# Patient Record
Sex: MALE | Race: WHITE | NOT HISPANIC OR LATINO | Employment: FULL TIME | URBAN - METROPOLITAN AREA
[De-identification: names, ages, dates, MRNs, and addresses within clinical notes are randomized per-mention and may not be internally consistent; named-entity substitution may affect disease eponyms.]

---

## 2020-03-16 ENCOUNTER — OFFICE VISIT (OUTPATIENT)
Dept: INTERNAL MEDICINE | Facility: CLINIC | Age: 20
End: 2020-03-16
Payer: COMMERCIAL

## 2020-03-16 VITALS
SYSTOLIC BLOOD PRESSURE: 122 MMHG | TEMPERATURE: 99 F | BODY MASS INDEX: 16.72 KG/M2 | DIASTOLIC BLOOD PRESSURE: 69 MMHG | WEIGHT: 112.88 LBS | OXYGEN SATURATION: 98 % | RESPIRATION RATE: 16 BRPM | HEIGHT: 69 IN | HEART RATE: 120 BPM

## 2020-03-16 DIAGNOSIS — F43.23 ACUTE ADJUSTMENT DISORDER WITH MIXED ANXIETY AND DEPRESSED MOOD: Primary | ICD-10-CM

## 2020-03-16 PROCEDURE — 99203 OFFICE O/P NEW LOW 30 MIN: CPT | Mod: S$PBB,,, | Performed by: FAMILY MEDICINE

## 2020-03-16 PROCEDURE — 99999 PR PBB SHADOW E&M-EST. PATIENT-LVL IV: ICD-10-PCS | Mod: PBBFAC,,, | Performed by: FAMILY MEDICINE

## 2020-03-16 PROCEDURE — 99999 PR PBB SHADOW E&M-EST. PATIENT-LVL IV: CPT | Mod: PBBFAC,,, | Performed by: FAMILY MEDICINE

## 2020-03-16 PROCEDURE — 99203 PR OFFICE/OUTPT VISIT, NEW, LEVL III, 30-44 MIN: ICD-10-PCS | Mod: S$PBB,,, | Performed by: FAMILY MEDICINE

## 2020-03-16 RX ORDER — HYDROXYZINE PAMOATE 25 MG/1
25-50 CAPSULE ORAL EVERY 8 HOURS PRN
Qty: 60 CAPSULE | Refills: 0 | Status: SHIPPED | OUTPATIENT
Start: 2020-03-16 | End: 2024-03-19

## 2020-03-16 NOTE — PROGRESS NOTES
Subjective:   Patient ID:  Xu Delarosa is a 20 y.o. male.    Chief Complaint:  Anxiety and Depression    History reviewed. No pertinent past medical history.  History reviewed. No pertinent surgical history.     Family History   Problem Relation Age of Onset    No Known Problems Mother     No Known Problems Father      Review of patient's allergies indicates:  No Known Allergies    Current Outpatient Medications:     hydrOXYzine pamoate (VISTARIL) 25 MG Cap, Take 1-2 capsules (25-50 mg total) by mouth every 8 (eight) hours as needed (Anxiety or Insomnia)., Disp: 60 capsule, Rfl: 0    Patient presents for referral to psychiatry/psychology.    Concerned because of increased anxiety and depressive type symptoms.    Trigger is ended 1 year relationship with boyfriend suddenly.  Had relocated from [place] 1 year ago due to this relationship.    Denies any previous mental health diagnoses.    Denies any previous mental health treatments.    Denies any significant family history for mental health.  Main complaints are difficulty sleeping, anxiety, trouble focusing, and unable to relax.    No suicidal or homicidal ideation.    Agreeing to both medication and therapy.    Anxiety   Presents for initial visit. Onset was 1 to 6 months ago. The problem has been gradually worsening. Symptoms include confusion, decreased concentration, depressed mood, excessive worry, insomnia, irritability, nervous/anxious behavior, palpitations and restlessness. Patient reports no chest pain, compulsions, dizziness, dry mouth, feeling of choking, hyperventilation, malaise, muscle tension, nausea, obsessions, panic, shortness of breath or suicidal ideas. Symptoms occur most days. The severity of symptoms is interfering with daily activities. The symptoms are aggravated by family issues. The quality of sleep is poor. Nighttime awakenings: several.     Risk factors include a major life event. There is no history of anxiety/panic attacks, bipolar  "disorder, depression, fibromyalgia or suicide attempts. Past treatments include nothing.     Review of Systems   Constitutional: Positive for irritability. Negative for activity change and unexpected weight change.   HENT: Negative for hearing loss, rhinorrhea and trouble swallowing.    Eyes: Negative for discharge and visual disturbance.   Respiratory: Negative for chest tightness, shortness of breath and wheezing.    Cardiovascular: Positive for palpitations. Negative for chest pain.   Gastrointestinal: Negative for constipation, diarrhea, nausea and vomiting.   Endocrine: Negative for polydipsia and polyuria.   Genitourinary: Negative for difficulty urinating, hematuria and urgency.   Musculoskeletal: Negative for arthralgias, joint swelling and neck pain.   Neurological: Negative for dizziness, weakness and headaches.   Psychiatric/Behavioral: Positive for confusion, decreased concentration and dysphoric mood. Negative for suicidal ideas. The patient is nervous/anxious and has insomnia.        Objective:   /69 (BP Location: Left arm, Patient Position: Sitting)   Pulse (!) 120   Temp 98.8 °F (37.1 °C) (Oral)   Resp 16   Ht 5' 9" (1.753 m)   Wt 51.2 kg (112 lb 14 oz)   SpO2 98%   BMI 16.67 kg/m²     Physical Exam   Constitutional: He is oriented to person, place, and time. Vital signs are normal. He appears well-developed and well-nourished.   Cardiovascular: Regular rhythm and normal heart sounds. Tachycardia present. Exam reveals no gallop and no friction rub.   No murmur heard.  Pulmonary/Chest: Effort normal and breath sounds normal. He has no wheezes. He has no rhonchi. He has no rales.   Abdominal: Soft. He exhibits no distension. There is no tenderness. There is no rebound, no guarding and no CVA tenderness.   Neurological: He is alert and oriented to person, place, and time. He has normal strength. No sensory deficit. He displays a negative Romberg sign. Coordination and gait normal.   Skin: " Skin is warm, dry and intact. No bruising, no burn, no laceration and no rash noted.   Psychiatric: Judgment and thought content normal. His mood appears not anxious. His affect is not angry, not blunt, not labile and not inappropriate. His speech is delayed. His speech is not rapid and/or pressured, not tangential and not slurred. He is slowed and withdrawn. He is not agitated, not aggressive, not hyperactive, not actively hallucinating and not combative. Thought content is not paranoid and not delusional. Cognition and memory are normal. He exhibits a depressed mood. He expresses no homicidal and no suicidal ideation. He expresses no suicidal plans and no homicidal plans. He is communicative.     Flat affect, monotone, poor eye contact. He is attentive.     Assessment:     1. Acute adjustment disorder with mixed anxiety and depressed mood      Plan:   Acute adjustment disorder with mixed anxiety and depressed mood  -     Ambulatory referral/consult to Psychiatry; Future; Expected date: 03/23/2020  -     hydrOXYzine pamoate (VISTARIL) 25 MG Cap; Take 1-2 capsules (25-50 mg total) by mouth every 8 (eight) hours as needed (Anxiety or Insomnia).  Dispense: 60 capsule; Refill: 0  -     Ambulatory referral/consult to Psychology; Future; Expected date: 03/23/2020    Patient is agreeable / open to both medication and counseling.    Referral to psychiatry and psychology   Start Vistaril as needed for anxiety/ insomnia  Return to clinic 1 month for annual physical exam

## 2020-12-18 ENCOUNTER — OFFICE VISIT (OUTPATIENT)
Dept: INTERNAL MEDICINE | Facility: CLINIC | Age: 20
End: 2020-12-18
Payer: COMMERCIAL

## 2020-12-18 VITALS
BODY MASS INDEX: 17.31 KG/M2 | HEART RATE: 76 BPM | TEMPERATURE: 98 F | WEIGHT: 116.88 LBS | OXYGEN SATURATION: 98 % | HEIGHT: 69 IN

## 2020-12-18 DIAGNOSIS — M25.561 ACUTE PAIN OF RIGHT KNEE: Primary | ICD-10-CM

## 2020-12-18 PROCEDURE — 99999 PR PBB SHADOW E&M-EST. PATIENT-LVL III: CPT | Mod: PBBFAC,,, | Performed by: PHYSICIAN ASSISTANT

## 2020-12-18 PROCEDURE — 3008F BODY MASS INDEX DOCD: CPT | Mod: CPTII,S$GLB,, | Performed by: PHYSICIAN ASSISTANT

## 2020-12-18 PROCEDURE — 99214 OFFICE O/P EST MOD 30 MIN: CPT | Mod: S$GLB,,, | Performed by: PHYSICIAN ASSISTANT

## 2020-12-18 PROCEDURE — 3008F PR BODY MASS INDEX (BMI) DOCUMENTED: ICD-10-PCS | Mod: CPTII,S$GLB,, | Performed by: PHYSICIAN ASSISTANT

## 2020-12-18 PROCEDURE — 99214 PR OFFICE/OUTPT VISIT, EST, LEVL IV, 30-39 MIN: ICD-10-PCS | Mod: S$GLB,,, | Performed by: PHYSICIAN ASSISTANT

## 2020-12-18 PROCEDURE — 99999 PR PBB SHADOW E&M-EST. PATIENT-LVL III: ICD-10-PCS | Mod: PBBFAC,,, | Performed by: PHYSICIAN ASSISTANT

## 2020-12-18 PROCEDURE — 1125F PR PAIN SEVERITY QUANTIFIED, PAIN PRESENT: ICD-10-PCS | Mod: S$GLB,,, | Performed by: PHYSICIAN ASSISTANT

## 2020-12-18 PROCEDURE — 1125F AMNT PAIN NOTED PAIN PRSNT: CPT | Mod: S$GLB,,, | Performed by: PHYSICIAN ASSISTANT

## 2020-12-18 RX ORDER — NAPROXEN 500 MG/1
500 TABLET ORAL 2 TIMES DAILY WITH MEALS
Qty: 14 TABLET | Refills: 0 | Status: SHIPPED | OUTPATIENT
Start: 2020-12-18 | End: 2020-12-25

## 2020-12-18 NOTE — PROGRESS NOTES
Subjective:      Patient ID: Xu Delarosa is a 20 y.o. male.    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 Quality: catches with bending. The pain is at a severity of 1/10 (varies. Sometimes unable to bear weight). The pain has been fluctuating since onset. Pertinent negatives include no inability to bear weight, loss of motion, loss of sensation, muscle weakness, numbness or tingling. Exacerbated by: inactivity. He has tried acetaminophen for the symptoms. The treatment provided no relief.   Denies any recent trauma. Denies any obvisous swelling.  Goes up and down stairs a lot because lives on third floor of [facility].     Review of Systems   Constitutional: Negative for activity change, appetite change, chills, diaphoresis, fatigue, fever and unexpected weight change.   HENT: Negative.  Negative for congestion, hearing loss, postnasal drip, rhinorrhea, sore throat, trouble swallowing and voice change.    Eyes: Negative.  Negative for discharge and visual disturbance.   Respiratory: Negative.  Negative for cough, choking, chest tightness, shortness of breath and wheezing.    Cardiovascular: Negative for chest pain, palpitations and leg swelling.   Gastrointestinal: Negative for abdominal distention, abdominal pain, blood in stool, constipation, diarrhea, nausea and vomiting.   Endocrine: Negative for cold intolerance, heat intolerance, polydipsia and polyuria.   Genitourinary: Negative.  Negative for difficulty urinating, frequency, hematuria and urgency.   Musculoskeletal: Positive for arthralgias. Negative for back pain, gait problem, joint swelling, myalgias, neck pain and neck stiffness.   Skin: Negative for color change, pallor, rash and wound.   Neurological: Negative for dizziness, tingling, tremors, weakness, light-headedness, numbness and headaches.   Hematological: Negative for adenopathy.   Psychiatric/Behavioral:  "Negative for behavioral problems, confusion, dysphoric mood, self-injury, sleep disturbance and suicidal ideas. The patient is not nervous/anxious.      Objective:   Pulse 76   Temp 98.2 °F (36.8 °C) (Tympanic)   Ht 5' 9" (1.753 m)   Wt 53 kg (116 lb 13.5 oz)   SpO2 98%   BMI 17.25 kg/m²     Physical Exam  Vitals signs reviewed.   Constitutional:       Appearance: He is well-developed.   HENT:      Head: Normocephalic and atraumatic.      Right Ear: External ear normal.      Left Ear: External ear normal.      Nose: Nose normal.   Eyes:      Conjunctiva/sclera: Conjunctivae normal.      Pupils: Pupils are equal, round, and reactive to light.   Neck:      Musculoskeletal: Normal range of motion and neck supple.   Cardiovascular:      Rate and Rhythm: Normal rate and regular rhythm.      Heart sounds: Normal heart sounds. No murmur. No friction rub. No gallop.    Pulmonary:      Effort: Pulmonary effort is normal. No respiratory distress.      Breath sounds: Normal breath sounds. No wheezing or rales.   Chest:      Chest wall: No tenderness.   Abdominal:      General: There is no distension.      Palpations: Abdomen is soft.      Tenderness: There is no abdominal tenderness.   Musculoskeletal: Normal range of motion.      Right knee: He exhibits normal range of motion, no swelling, no effusion, no ecchymosis, no deformity, no laceration, no erythema, normal alignment, no LCL laxity, normal patellar mobility, no bony tenderness, normal meniscus and no MCL laxity. Tenderness found. MCL tenderness noted.        Legs:    Lymphadenopathy:      Cervical: No cervical adenopathy.   Skin:     General: Skin is warm and dry.   Neurological:      Mental Status: He is alert and oriented to person, place, and time.   Psychiatric:         Behavior: Behavior normal.         Thought Content: Thought content normal.         Judgment: Judgment normal.         Assessment:     1. Acute pain of right knee      Plan:   Acute pain of " right knee  -     naproxen (NAPROSYN) 500 MG tablet; Take 1 tablet (500 mg total) by mouth 2 (two) times daily with meals. for 7 days  Dispense: 14 tablet; Refill: 0    -RICE  -knee brace  Educational handout on over-the-counter medications and at-home conservative care, pertinent to the patients diagnosis today, was handed to the patient and discussed in detail.  - if no improvement with treatment will refer for pt/ot    Follow up if symptoms worsen or fail to improve.

## 2020-12-18 NOTE — PATIENT INSTRUCTIONS
Quad Set for Leg and Knee    This exercise is designed to stretch and strengthen your knee. Before beginning, read through all the instructions. While exercising, breathe normally and use smooth movements. If you feel any pain, stop the exercise. If pain persists, call your healthcare provider.  1.  Sit on the floor with one leg straight, the other bent.  2.  Flex the foot of your straight leg by pointing your toes toward you. Press the back of your knee into the floor while tightening the muscle on the top of your thigh. Hold for ______ seconds. Then relax.  3.  Repeat ______ times. Do ______ sets a day.  Caution  · Dont arch your back.  · Dont hunch your shoulders.  Date Last Reviewed: 9/20/2015  © 8192-7235 [employer]. [address] [place] [address]. All rights reserved. This information is not intended as a substitute for professional medical care. Always follow your healthcare professional's instructions.          Knee Pain of Uncertain Cause    There are several common causes for knee pain. These can include:  · A sprain of the ligaments that support the joint  · An injury to the cartilage lining of the joint  · Arthritis from wear-and-tear or inflammation  There are other causes as well. There may also be swelling, reduced movement of the knee joint, and pain with walking. A definite diagnosis will still need to be made. If your symptoms do not improve, further follow-up and testing may be needed.  Home care  · Stay off the injured leg as much as possible until pain improves.  · Apply an ice pack over the injured area for 15 to 20 minutes every 3 to 6 hours. You should do this for the first 24 to 48 hours. You can make an ice pack by filling a plastic bag that seals at the top with ice cubes and then wrapping it with a thin towel. Continue to use ice packs for relief of pain and swelling as needed. As the ice melts, be careful to avoid getting your wrap, splint, or cast wet.  After 48 hours, apply heat (warm shower or warm bath) for 15 to 20 minutes several times a day, or alternate ice and heat. If you have to wear a hook-and-loop knee brace, you can open it to apply the ice pack, or heat, directly to the knee. Never put ice directly on the skin. Always wrap the ice in a towel or other type of cloth.  · You may use over-the-counter pain medicine to control pain, unless another pain medicine was prescribed. If you have chronic liver or kidney disease or ever had a stomach ulcer or GI bleeding, talk with your healthcare provider using these medicines.  · If crutches or a walker have been recommended, do not put weight on the injured leg until you can do so without pain. Check with your healthcare provider before returning to sports or full work duties.  · If you have a hook-and-loop knee brace, you can remove it to bathe and sleep, unless told otherwise.  Follow-up care  Follow up with your healthcare provider as advised. This is usually within 1-2 weeks.  If X-rays were taken, you will be told of any new findings that may affect your care.  Call 911  Call 911 if you have:  · Shortness of breath  · Chest pain  When to seek medical advice  Call your healthcare provider right away if any of these occur:  · Toes or foot becomes swollen, cold, blue, numb, or tingly  · Pain or swelling spreads over the knee or calf  · Warmth or redness appears over the knee or calf  · Other joints become painful  · Rash appears  · Fever of 100.4°F (38°C) or above lasting for 24 to 48 hours  Date Last Reviewed: 11/23/2015  © 5151-3429 [employer]. [address] [place] [address]. All rights reserved. This information is not intended as a substitute for professional medical care. Always follow your healthcare professional's instructions.        How Your Knee Works  A healthy knee bends easily and rotates slightly. The joint absorbs stress and moves smoothly. This allows you to walk,  squat, and turn without pain.    A healthy knee  The knee is a hinge joint, formed where the thighbone (femur) and the shinbone (tibia) meet. It is the largest joint in the body. The joint is covered with smooth tissue and powered by large muscles. When all the parts listed below are healthy, a knee should move easily:  · Cartilage is a layer of smooth tissue. It covers the ends of the thighbone and shinbone. It also lines the back side of the kneecap. Healthy cartilage absorbs stress and allows the knee to bend easily.  · Muscles power the knee and leg for movement.  · Tendons attach the muscles to the bones.  · Ligaments are bands of tissue that connect bones and brace the joint.  · Bones that make up your knee joint include your thighbone (femur), shinbone (tibia), and kneecap (patella).  · Menisci are 2 wedge shaped pieces of cartilage that absorb shock between the thighbone and shinbone.  Date Last Reviewed: 9/20/2015 © 2000-2017 [employer]. [address] [place] [address]. All rights reserved. This information is not intended as a substitute for professional medical care. Always follow your healthcare professional's instructions.        R.I.C.E.    R.I.C.E. stands for Rest, Ice, Compression, and Elevation. Doing these things helps limit pain and swelling after an injury. R.I.C.E. also helps injuries heal faster. Use R.I.C.E. for sprains, strains, and severe bruises or bumps. Follow the tips on this handout and begin R.I.C.E. as soon as possible after an injury.  ? Rest  Pain is your bodys way of telling you to rest an injured area. Whether you have hurt an elbow, hand, foot, or knee, limiting its use will prevent further injury and help you heal.  ? Ice  Applying ice right after an injury helps prevent swelling and reduce pain. Dont place ice directly on your skin.  · Wrap a cold pack or bag of ice in a thin cloth. Place it over the injured area.  · Ice for 10 minutes every 3 hours.  Dont ice for more than 20 minutes at a time.  ? Compression  Putting pressure (compression) on an injury helps prevent swelling and provides support.  · Wrap the injured area firmly with an elastic bandage. If your hand or foot tingles, becomes discolored, or feels cold to the touch, the bandage may be too tight. Rewrap it more loosely.  · If your bandage becomes too loose, rewrap it.  · Do not wear an elastic bandage overnight.  ? Elevation  Keeping an injury elevated helps reduce swelling, pain, and throbbing. Elevation is most effective when the injury is kept elevated higher than the heart.     Call your healthcare provider if you notice any of the following:  · Fingers or toes feel numb, are cold to the touch, or change color  · Skin looks shiny or tight  · Pain, swelling, or bruising worsens and is not improved with elevation   Date Last Reviewed: 9/3/2015  © 2716-3148 [employer]. [address] [place] [address], [place] [address]. All rights reserved. This information is not intended as a substitute for professional medical care. Always follow your healthcare professional's instructions.

## 2020-12-27 ENCOUNTER — PATIENT MESSAGE (OUTPATIENT)
Dept: INTERNAL MEDICINE | Facility: CLINIC | Age: 20
End: 2020-12-27

## 2022-04-27 ENCOUNTER — PATIENT MESSAGE (OUTPATIENT)
Dept: ADMINISTRATIVE | Facility: HOSPITAL | Age: 22
End: 2022-04-27
Payer: COMMERCIAL

## 2022-09-16 ENCOUNTER — PATIENT OUTREACH (OUTPATIENT)
Dept: ADMINISTRATIVE | Facility: HOSPITAL | Age: 22
End: 2022-09-16
Payer: COMMERCIAL

## 2022-09-16 NOTE — PROGRESS NOTES
Called patient to schedule overdue PCP appt, Patient answered, patient no longer lives in [place].

## 2023-01-11 ENCOUNTER — OFFICE VISIT (OUTPATIENT)
Dept: PRIMARY CARE CLINIC | Facility: CLINIC | Age: 23
End: 2023-01-11
Payer: COMMERCIAL

## 2023-01-11 DIAGNOSIS — J06.9 UPPER RESPIRATORY TRACT INFECTION, UNSPECIFIED TYPE: Primary | ICD-10-CM

## 2023-01-11 PROCEDURE — 99214 OFFICE O/P EST MOD 30 MIN: CPT | Mod: 95,,, | Performed by: NURSE PRACTITIONER

## 2023-01-11 PROCEDURE — 99214 PR OFFICE/OUTPT VISIT, EST, LEVL IV, 30-39 MIN: ICD-10-PCS | Mod: 95,,, | Performed by: NURSE PRACTITIONER

## 2023-01-11 RX ORDER — FLUTICASONE PROPIONATE 50 MCG
1 SPRAY, SUSPENSION (ML) NASAL DAILY
Qty: 16 G | Refills: 0 | Status: SHIPPED | OUTPATIENT
Start: 2023-01-11 | End: 2023-01-21

## 2023-01-18 NOTE — PROGRESS NOTES
Subjective:       Patient ID: Xu Delarosa is a 22 y.o. male.    Chief Complaint: cough and chest congestion  The patient location is: [place], [place]    Visit type: audiovisual-Synchronous      Face to Face time with patient: 11 min  12 minutes of total time spent on the encounter, which includes face to face time and non-face to face time preparing to see the patient (eg, review of tests), Obtaining and/or reviewing separately obtained history, Documenting clinical information in the electronic or other health record, Independently interpreting results (not separately reported) and communicating results to the patient/family/caregiver, or Care coordination (not separately reported).         Each patient to whom he or she provides medical services by telemedicine is:  (1) informed of the relationship between the physician and patient and the respective role of any other health care provider with respect to management of the patient; and (2) notified that he or she may decline to receive medical services by telemedicine and may withdraw from such care at any time.   History of Present Illness:   Xu Delarosa 22 y.o. male presents today with reports of cough and chest congestion that has been present for about 2 days. Denies any other problems or concerns at this time. Treatment options and alternatives were discussed with the patient. Patient provided opportunity to ask additional questions.  All questions were answered. Voices understanding and acceptance of this advice. Instructed to call back if any further questions or concerns.      No past medical history on file.  Family History   Problem Relation Age of Onset    No Known Problems Mother     No Known Problems Father      Social History     Socioeconomic History    Marital status: Single   Tobacco Use    Smoking status: Never    Smokeless tobacco: Never   Substance and Sexual Activity    Alcohol use: Yes    Drug use: Never    Sexual activity: Yes     Partners: Male      Birth control/protection: Condom     Social Determinants of Health     Financial Resource Strain: Medium Risk    Difficulty of Paying Living Expenses: Somewhat hard   Food Insecurity: No Food Insecurity    Worried About Running Out of Food in the Last Year: Never true    Ran Out of Food in the Last Year: Never true   Transportation Needs: No Transportation Needs    Lack of Transportation (Medical): No    Lack of Transportation (Non-Medical): No   Physical Activity: Insufficiently Active    Days of Exercise per Week: 5 days    Minutes of Exercise per Session: 20 min   Stress: Stress Concern Present    Feeling of Stress : To some extent   Social Connections: Unknown    Frequency of Communication with Friends and Family: Three times a week    Frequency of Social Gatherings with Friends and Family: Patient refused    Active Member of Clubs or Organizations: No    Attends Club or Organization Meetings: Never    Marital Status: Never    Housing Stability: Low Risk     Unable to Pay for Housing in the Last Year: No    Number of Places Lived in the Last Year: 1    Unstable Housing in the Last Year: No     Outpatient Encounter Medications as of 1/11/2023   Medication Sig Dispense Refill    fluticasone propionate (FLONASE) 50 mcg/actuation nasal spray 1 spray (50 mcg total) by Each Nostril route once daily. for 10 days 16 g 0    hydrOXYzine pamoate (VISTARIL) 25 MG Cap Take 1-2 capsules (25-50 mg total) by mouth every 8 (eight) hours as needed (Anxiety or Insomnia). 60 capsule 0     No facility-administered encounter medications on file as of 1/11/2023.       Review of Systems   HENT:  Positive for congestion, sore throat and trouble swallowing. Negative for drooling, ear discharge and ear pain.    Respiratory:  Positive for cough. Negative for shortness of breath and stridor.    Gastrointestinal:  Negative for abdominal pain, diarrhea and vomiting.   Musculoskeletal:  Negative for neck pain.   Neurological:   Positive for headaches.     Objective:      There were no vitals taken for this visit.  Physical Exam    No results found for this or any previous visit.  Assessment:       1. Upper respiratory tract infection, unspecified type          Plan:   Diagnoses and all orders for this visit:    Upper respiratory tract infection, unspecified type    Other orders  -     fluticasone propionate (FLONASE) 50 mcg/actuation nasal spray; 1 spray (50 mcg total) by Each Nostril route once daily. for 10 days              [facility]   [address] [place] [address] [address]  [place] [address]  Office [phone]  Fax [phone]

## 2023-01-19 ENCOUNTER — OFFICE VISIT (OUTPATIENT)
Dept: INTERNAL MEDICINE | Facility: CLINIC | Age: 23
End: 2023-01-19
Payer: COMMERCIAL

## 2023-01-19 VITALS
HEART RATE: 105 BPM | TEMPERATURE: 98 F | DIASTOLIC BLOOD PRESSURE: 60 MMHG | OXYGEN SATURATION: 99 % | HEIGHT: 69 IN | BODY MASS INDEX: 15.87 KG/M2 | WEIGHT: 107.13 LBS | SYSTOLIC BLOOD PRESSURE: 112 MMHG

## 2023-01-19 DIAGNOSIS — J02.9 ACUTE PHARYNGITIS, UNSPECIFIED ETIOLOGY: Primary | ICD-10-CM

## 2023-01-19 PROCEDURE — 3078F DIAST BP <80 MM HG: CPT | Mod: CPTII,S$GLB,, | Performed by: EMERGENCY MEDICINE

## 2023-01-19 PROCEDURE — 1159F MED LIST DOCD IN RCRD: CPT | Mod: CPTII,S$GLB,, | Performed by: EMERGENCY MEDICINE

## 2023-01-19 PROCEDURE — 1160F RVW MEDS BY RX/DR IN RCRD: CPT | Mod: CPTII,S$GLB,, | Performed by: EMERGENCY MEDICINE

## 2023-01-19 PROCEDURE — 3074F PR MOST RECENT SYSTOLIC BLOOD PRESSURE < 130 MM HG: ICD-10-PCS | Mod: CPTII,S$GLB,, | Performed by: EMERGENCY MEDICINE

## 2023-01-19 PROCEDURE — 3008F PR BODY MASS INDEX (BMI) DOCUMENTED: ICD-10-PCS | Mod: CPTII,S$GLB,, | Performed by: EMERGENCY MEDICINE

## 2023-01-19 PROCEDURE — 1160F PR REVIEW ALL MEDS BY PRESCRIBER/CLIN PHARMACIST DOCUMENTED: ICD-10-PCS | Mod: CPTII,S$GLB,, | Performed by: EMERGENCY MEDICINE

## 2023-01-19 PROCEDURE — 3008F BODY MASS INDEX DOCD: CPT | Mod: CPTII,S$GLB,, | Performed by: EMERGENCY MEDICINE

## 2023-01-19 PROCEDURE — 99213 OFFICE O/P EST LOW 20 MIN: CPT | Mod: S$GLB,,, | Performed by: EMERGENCY MEDICINE

## 2023-01-19 PROCEDURE — 3074F SYST BP LT 130 MM HG: CPT | Mod: CPTII,S$GLB,, | Performed by: EMERGENCY MEDICINE

## 2023-01-19 PROCEDURE — 3078F PR MOST RECENT DIASTOLIC BLOOD PRESSURE < 80 MM HG: ICD-10-PCS | Mod: CPTII,S$GLB,, | Performed by: EMERGENCY MEDICINE

## 2023-01-19 PROCEDURE — 99999 PR PBB SHADOW E&M-EST. PATIENT-LVL III: CPT | Mod: PBBFAC,,, | Performed by: EMERGENCY MEDICINE

## 2023-01-19 PROCEDURE — 99213 PR OFFICE/OUTPT VISIT, EST, LEVL III, 20-29 MIN: ICD-10-PCS | Mod: S$GLB,,, | Performed by: EMERGENCY MEDICINE

## 2023-01-19 PROCEDURE — 99999 PR PBB SHADOW E&M-EST. PATIENT-LVL III: ICD-10-PCS | Mod: PBBFAC,,, | Performed by: EMERGENCY MEDICINE

## 2023-01-19 PROCEDURE — 1159F PR MEDICATION LIST DOCUMENTED IN MEDICAL RECORD: ICD-10-PCS | Mod: CPTII,S$GLB,, | Performed by: EMERGENCY MEDICINE

## 2023-01-19 RX ORDER — AZITHROMYCIN 250 MG/1
TABLET, FILM COATED ORAL
Qty: 6 TABLET | Refills: 0 | Status: SHIPPED | OUTPATIENT
Start: 2023-01-19 | End: 2023-01-24

## 2023-01-19 NOTE — PROGRESS NOTES
Subjective:       Patient ID: Xu Delarosa is a 22 y.o. male.    Chief Complaint: Sore Throat    Sore Throat   Associated symptoms include congestion and coughing.     22 yr WM, no PMH, here c/o week long runny nose, sore throat, cough, yellow sputum. It got better initially but then got worse last 2-3 days. No fever but has chest congestion with thick yellow sputum and nasal discharge. Had Covid Vaccine but no Flu vaccine.     History reviewed. No pertinent past medical history.  History reviewed. No pertinent surgical history.  History reviewed. No pertinent surgical history.  Social History     Socioeconomic History    Marital status: Single   Tobacco Use    Smoking status: Never    Smokeless tobacco: Never   Substance and Sexual Activity    Alcohol use: Yes    Drug use: Never    Sexual activity: Yes     Partners: Male     Birth control/protection: Condom     Social Determinants of Health     Financial Resource Strain: Medium Risk    Difficulty of Paying Living Expenses: Somewhat hard   Food Insecurity: No Food Insecurity    Worried About Running Out of Food in the Last Year: Never true    Ran Out of Food in the Last Year: Never true   Transportation Needs: No Transportation Needs    Lack of Transportation (Medical): No    Lack of Transportation (Non-Medical): No   Physical Activity: Insufficiently Active    Days of Exercise per Week: 5 days    Minutes of Exercise per Session: 20 min   Stress: Stress Concern Present    Feeling of Stress : To some extent   Social Connections: Unknown    Frequency of Communication with Friends and Family: Three times a week    Frequency of Social Gatherings with Friends and Family: Patient refused    Active Member of Clubs or Organizations: No    Attends Club or Organization Meetings: Never    Marital Status: Never    Housing Stability: Low Risk     Unable to Pay for Housing in the Last Year: No    Number of Places Lived in the Last Year: 1    Unstable Housing in the Last Year:  No     Review of patient's allergies indicates:  No Known Allergies  Current Outpatient Medications   Medication Sig    fluticasone propionate (FLONASE) 50 mcg/actuation nasal spray 1 spray (50 mcg total) by Each Nostril route once daily. for 10 days (Patient not taking: Reported on 1/19/2023)    hydrOXYzine pamoate (VISTARIL) 25 MG Cap Take 1-2 capsules (25-50 mg total) by mouth every 8 (eight) hours as needed (Anxiety or Insomnia).     No current facility-administered medications for this visit.           Review of Systems   HENT:  Positive for congestion, rhinorrhea and sore throat. Negative for postnasal drip.    Respiratory:  Positive for cough and chest tightness.    Cardiovascular: Negative.    Gastrointestinal: Negative.      Objective:      Physical Exam  Vitals and nursing note reviewed.   Constitutional:       General: He is not in acute distress.     Appearance: Normal appearance. He is not ill-appearing.   HENT:      Right Ear: Tympanic membrane, ear canal and external ear normal.      Left Ear: Tympanic membrane, ear canal and external ear normal.      Nose: Congestion and rhinorrhea present.      Mouth/Throat:      Mouth: Mucous membranes are moist.      Pharynx: Oropharynx is clear. Posterior oropharyngeal erythema present. No oropharyngeal exudate.   Cardiovascular:      Rate and Rhythm: Normal rate and regular rhythm.      Pulses: Normal pulses.      Heart sounds: Normal heart sounds.   Pulmonary:      Effort: No respiratory distress.      Breath sounds: Normal breath sounds. No wheezing or rales.   Musculoskeletal:      Cervical back: Normal range of motion and neck supple.   Neurological:      Mental Status: He is alert.       Assessment:     Vitals:    01/19/23 0902   BP: 112/60   Pulse: 105   Temp: 98.3 °F (36.8 °C)         1. Acute pharyngitis, unspecified etiology        Plan:   Acute pharyngitis, unspecified etiology- rest, gargle with warm salt water tid, Zithromax z pack, Airborne vit plus  MVI and Vit D3 5000 units daily  RTC prn

## 2024-03-19 ENCOUNTER — OFFICE VISIT (OUTPATIENT)
Dept: FAMILY MEDICINE | Facility: CLINIC | Age: 24
End: 2024-03-19
Payer: COMMERCIAL

## 2024-03-19 VITALS
WEIGHT: 105 LBS | TEMPERATURE: 98 F | SYSTOLIC BLOOD PRESSURE: 111 MMHG | HEIGHT: 69 IN | DIASTOLIC BLOOD PRESSURE: 72 MMHG | RESPIRATION RATE: 18 BRPM | OXYGEN SATURATION: 98 % | BODY MASS INDEX: 15.55 KG/M2 | HEART RATE: 61 BPM

## 2024-03-19 DIAGNOSIS — F17.290 VAPING NICOTINE DEPENDENCE, TOBACCO PRODUCT: ICD-10-CM

## 2024-03-19 DIAGNOSIS — F33.1 MODERATE EPISODE OF RECURRENT MAJOR DEPRESSIVE DISORDER: ICD-10-CM

## 2024-03-19 DIAGNOSIS — Z13.220 ENCOUNTER FOR LIPID SCREENING FOR CARDIOVASCULAR DISEASE: ICD-10-CM

## 2024-03-19 DIAGNOSIS — Z13.6 ENCOUNTER FOR LIPID SCREENING FOR CARDIOVASCULAR DISEASE: ICD-10-CM

## 2024-03-19 DIAGNOSIS — E43 SEVERE PROTEIN-CALORIE MALNUTRITION: ICD-10-CM

## 2024-03-19 DIAGNOSIS — Z00.00 ENCOUNTER FOR WELLNESS EXAMINATION: Primary | ICD-10-CM

## 2024-03-19 PROCEDURE — 1159F MED LIST DOCD IN RCRD: CPT | Mod: CPTII,,, | Performed by: FAMILY MEDICINE

## 2024-03-19 PROCEDURE — 3074F SYST BP LT 130 MM HG: CPT | Mod: CPTII,,, | Performed by: FAMILY MEDICINE

## 2024-03-19 PROCEDURE — 3078F DIAST BP <80 MM HG: CPT | Mod: CPTII,,, | Performed by: FAMILY MEDICINE

## 2024-03-19 PROCEDURE — 3008F BODY MASS INDEX DOCD: CPT | Mod: CPTII,,, | Performed by: FAMILY MEDICINE

## 2024-03-19 PROCEDURE — 1160F RVW MEDS BY RX/DR IN RCRD: CPT | Mod: CPTII,,, | Performed by: FAMILY MEDICINE

## 2024-03-19 PROCEDURE — 99214 OFFICE O/P EST MOD 30 MIN: CPT | Mod: 25,,, | Performed by: FAMILY MEDICINE

## 2024-03-19 PROCEDURE — 99395 PREV VISIT EST AGE 18-39: CPT | Mod: ,,, | Performed by: FAMILY MEDICINE

## 2024-03-19 RX ORDER — ESCITALOPRAM OXALATE 5 MG/1
5 TABLET ORAL DAILY
Qty: 30 TABLET | Refills: 0 | Status: SHIPPED | OUTPATIENT
Start: 2024-03-19 | End: 2024-04-11

## 2024-03-19 NOTE — PROGRESS NOTES
"Xu Delarosa  03/19/2024  [id number]    Gilma Driscoll MD  Patient Care Team:  Gilma Driscoll MD as PCP - General      Chief Complaint:  Chief Complaint   Patient presents with    Establish Care     Needs PCP. Pt is c/o depression       History of Present Illness:    24 y.o. male who presents today    Review of Systems  General: denies f/c, weight loss, night sweats, decreased appetite  Eye: denies blurred vision, changes in vision  Respiratory: denies sob, wheezing, cough  Cardiovascular: denies chest pain, palpitations, edema  Gastrointestinal: denies abdominal pain, n/v, constipation, diarrhea  Integumentary: denies rashes, pruritis    Past Medical History  History reviewed. No pertinent past medical history.    Medications  Medication List with Changes/Refills   Discontinued Medications    HYDROXYZINE PAMOATE (VISTARIL) 25 MG CAP    Take 1-2 capsules (25-50 mg total) by mouth every 8 (eight) hours as needed (Anxiety or Insomnia).       History reviewed. No pertinent surgical history.    SUBJECTIVE:  Health Maintenance  The patient has no Health Maintenance topics of status Not Due  Health Maintenance Due   Topic Date Due    Hepatitis C Screening  Never done    Lipid Panel  Never done    HPV Vaccines (1 - Male 2-dose series) Never done    HIV Screening  Never done    TETANUS VACCINE  04/20/2022    Influenza Vaccine (1) 09/01/2023    COVID-19 Vaccine (3 - 2023-24 season) 09/01/2023       Exam:  Vitals:    03/19/24 0928   BP: 111/72   BP Location: Right arm   Patient Position: Sitting   BP Method: Small (Automatic)   Pulse: 61   Resp: 18   Temp: 98.4 °F (36.9 °C)   TempSrc: Oral   SpO2: 98%   Weight: 47.6 kg (105 lb)   Height: 5' 9" (1.753 m)     Weight: 47.6 kg (105 lb)   Body mass index is 15.51 kg/m².      Physical Exam  Constitutional: NAD, alert, pleasant  Respiratory: CTAB, no wheezes, rales or rhonchi. No accessory muscle use  Eyes: EOMI  Cardiovascular: RRR, No m/r/g. No JVD. No LE " edema  Gastrointestinal: BS+, nontender, nondistended  Integumentary: warm, dry, intact  Psych: AA&Ox3    No diagnosis found.    {There are no diagnoses linked to this encounter. (Refresh or delete this SmartLink)}     Follow up: No follow-ups on file.      Care Plan/Goals: Reviewed   Goals    None

## 2024-03-19 NOTE — PROGRESS NOTES
Patient ID: [id number]     Chief Complaint: Establish Care (Needs PCP. Pt is c/o depression)        HPI:     Xu Delarosa is a 24 y.o. male here today for an annual wellness.    As a separate em visit, he c/o anhedonia, difficulty concentrating, feeling down and sad often, hopelessness. He reports feeling this way for about one year. No si/hi, self harm. He is also experiencing some insomnia. He also c/o unintentional weight loss despite eating over 2000 calorie a day diet. Right now, he is eating less due to depression. He has never been treated for depression. He is requesitng a psych referral. He has an appt with a dietitian this afternoon to help with his weight.     He just got screened for all STIS and will be starting PREP.       No past medical history on file.     History reviewed. No pertinent surgical history.    Review of patient's allergies indicates:  No Known Allergies    No outpatient medications have been marked as taking for the 3/19/24 encounter (Office Visit) with Gilma Driscoll MD.       Social History     Socioeconomic History    Marital status: Single   Tobacco Use    Smoking status: Every Day     Types: Vaping with nicotine    Smokeless tobacco: Never   Substance and Sexual Activity    Alcohol use: Yes    Drug use: Never    Sexual activity: Yes     Partners: Male     Birth control/protection: Condom     Social Determinants of Health     Financial Resource Strain: Medium Risk (3/19/2024)    Overall Financial Resource Strain (CARDIA)     Difficulty of Paying Living Expenses: Somewhat hard   Food Insecurity: Food Insecurity Present (3/19/2024)    Hunger Vital Sign     Worried About Running Out of Food in the Last Year: Sometimes true     Ran Out of Food in the Last Year: Sometimes true   Transportation Needs: No Transportation Needs (3/19/2024)    PRAPARE - Transportation     Lack of Transportation (Medical): No     Lack of Transportation (Non-Medical): No   Physical Activity: Inactive  (3/19/2024)    Exercise Vital Sign     Days of Exercise per Week: 0 days     Minutes of Exercise per Session: 0 min   Stress: Stress Concern Present (3/18/2024)    [employer] [place] [employer] - Occupational Stress Questionnaire     Feeling of Stress : Rather much   Social Connections: Unknown (3/19/2024)    Social Connection and Isolation Panel [NHANES]     Frequency of Communication with Friends and Family: More than three times a week     Frequency of Social Gatherings with Friends and Family: Once a week     Active Member of Clubs or Organizations: No     Attends Club or Organization Meetings: Never     Marital Status: Never    Housing Stability: High Risk (3/19/2024)    Housing Stability Vital Sign     Unable to Pay for Housing in the Last Year: No     Number of Places Lived in the Last Year: 3     Unstable Housing in the Last Year: No        Family History   Problem Relation Age of Onset    Heart disease Mother     Heart disease Father     Alcohol abuse Father     Arthritis Maternal Grandmother     Heart disease Maternal Grandmother     Stroke Maternal Grandmother     Heart disease Paternal Grandfather     Diabetes Paternal Grandmother         Patient Care Team:  Gilma Driscoll MD as PCP - General     Subjective:     ROS    See HPI for details    Constitutional: Denies Change in appetite. Denies Chills. Denies Fever. Denies Night sweats.  Eye: Denies Blurred vision. Denies Discharge. Denies Eye pain.  ENT: Denies Decreased hearing. Denies Sore throat. Denies Swollen glands.  Respiratory: Denies Cough. Denies Shortness of breath. Denies Shortness of breath with exertion. Denies Wheezing.  Cardiovascular: DeniesChest pain at rest. Denies Chest pain with exertion. Denies Irregular heartbeat. Denies Palpitations. Denies Edema.  Gastrointestinal: Denies Abdominal pain. DeniesDiarrhea. Denies Nausea. Denies Vomiting. Denies Hematemesis or Hematochezia.  Genitourinary: Denies Dysuria.  "Denies Urinary frequency. Denies Urinary urgency. Denies Blood in urine.  Integumentary: Denies Rash. Denies Itching. Denies Dry skin.  Psychiatric: Denies Depression. Denies Anxiety. Denies Suicidal/Homicidal ideations.    All Other ROS: Negative except as stated in HPI.       Objective:     /72 (BP Location: Right arm, Patient Position: Sitting, BP Method: Small (Automatic))   Pulse 61   Temp 98.4 °F (36.9 °C) (Oral)   Resp 18   Ht 5' 9" (1.753 m)   Wt 47.6 kg (105 lb)   SpO2 98%   BMI 15.51 kg/m²     Physical Exam    General: Alert and oriented, No acute distress. Temporal wasting  Head: Normocephalic, Atraumatic.  Eye: Pupils are equal, round and reactive to light, Extraocular movements are intact, Sclera non-icteric.  Ears/Nose/Throat: Tm+ light reflexes bilaterally. Normal, Mucosa moist,Clear. Teeth intact. NO lesions of tongue, palate, mucosa  Respiratory: Clear to auscultation bilaterally; No wheezes, rales or rhonchi,Non-labored respirations, Symmetrical chest wall expansion.  Cardiovascular: Regular rate and rhythm, S1/S2 normal, No murmurs, rubs or gallops.  Gastrointestinal: Soft, Non-tender, Non-distended, Normal bowel sounds, No palpable organomegaly.  Integumentary: Warm, Dry, Intact, No suspicious lesions or rashes.  Extremities: No clubbing, cyanosis or edema  Psychiatric: Normal interaction, Coherent speech, Euthymic mood, Appropriate affect       Assessment:       ICD-10-CM ICD-9-CM   1. Encounter for wellness examination  Z00.00 V70.0   2. Moderate episode of recurrent major depressive disorder  F33.1 296.32   3. Vaping nicotine dependence, tobacco product  F17.290 305.1   4. Severe protein-calorie malnutrition  E43 262   5. Encounter for lipid screening for cardiovascular disease  Z13.220 V77.91    Z13.6 V81.2        Plan:       The patient has no Health Maintenance topics of status Not Due     AAA Screening - screening for abdominal aneurysms if you have ever smoked a " cigarette    Prostate Cancer Screening - starting at age 45 for  men and 50 if . Start sooner if father/brother with prostate cancer    Colon Cancer Screening -  recommended starting at age 45 unless a first degree relative has/had colon cancer then may be needed sooner    Eye Exam - Recommend annually.     Dental Exam - Recommend biannual exams.     Vaccinations -   Immunization History   Administered Date(s) Administered    COVID-19, MRNA, LN-S, PF (Pfizer) (Purple Cap) 04/26/2021, 05/22/2021    DT (Pediatric) 1999, 1999, 09/16/2004, 05/09/2005    HIB 1999, 1999    Hepatitis B, Pediatric/Adolescent 1999, 1999, 1999    Measles 09/16/2004, 05/09/2005    Meningococcal Polysaccharide (23-Valent) (MPSV4) 04/20/2012    Mumps 09/16/2004, 05/09/2005    Poliovirus 1999, 1999, 09/16/2004    Rubella 09/16/2004, 05/09/2005    Tdap 04/20/2012    Varicella 09/16/2004, 04/20/2012      Patient was counseled on risks/benefits of receiving immunization. All questions were answered    Problem List Items Addressed This Visit          Psychiatric    Moderate episode of recurrent major depressive disorder    Overview     Refer to psych per patient's request  Start lexapro 5 mg daily  Rtc 2 wks with labs    I counseled patient on adverse effects such as n/v, d/c, anxiety, worsening depression or SI/HI. Call 911 or go to ED if you have SI/HI.   Take medication at bedtime if it causes drowsiness during the day.           Relevant Medications    EScitalopram oxalate (LEXAPRO) 5 MG Tab    Other Relevant Orders    Ambulatory referral/consult to Psychiatry       Endocrine    Severe protein-calorie malnutrition    Overview     He has an appt with a dietitian this afternoon  Recommend high fat high protein diet  Will check all vitamin levels           Relevant Orders    Iron and TIBC    Ferritin    Folate    Vitamin B12    Vitamin D       Other    Vaping nicotine  dependence, tobacco product    Overview     He vapes daily through the day with nicotine.     Smoking cessation encouraged          Other Visit Diagnoses       Encounter for wellness examination    -  Primary    Relevant Orders    CBC Auto Differential    Comprehensive Metabolic Panel    Lipid Panel    TSH    Urinalysis    Iron and TIBC    Encounter for lipid screening for cardiovascular disease        Relevant Orders    Lipid Panel            Exercise for a total of 150 min per week and eat a healthy diet  Perform monthly self testicular exams to screen for testicular cancer  Stay up to date with all cancer screening discussed in visit  Immunizations due were discussed during visit  All health maintenance was reviewed with patient. Patient verbalized understanding. All questions were answered.     Medication List with Changes/Refills   New Medications    ESCITALOPRAM OXALATE (LEXAPRO) 5 MG TAB    Take 1 tablet (5 mg total) by mouth once daily.       Start Date: 3/19/2024 End Date: 3/19/2025   Discontinued Medications    HYDROXYZINE PAMOATE (VISTARIL) 25 MG CAP    Take 1-2 capsules (25-50 mg total) by mouth every 8 (eight) hours as needed (Anxiety or Insomnia).       Start Date: 3/16/2020 End Date: 3/19/2024          The patient's Health Maintenance was reviewed and the following appears to be due at this time:   Health Maintenance Due   Topic Date Due    Hepatitis C Screening  Never done    Lipid Panel  Never done    HPV Vaccines (1 - Male 2-dose series) Never done    HIV Screening  Never done    TETANUS VACCINE  04/20/2022    Influenza Vaccine (1) 09/01/2023    COVID-19 Vaccine (3 - 2023-24 season) 09/01/2023         Follow up for 2 wks depression with labs. In addition to their scheduled follow up, the patient has also been instructed to follow up on as needed basis.

## 2024-04-11 DIAGNOSIS — F33.1 MODERATE EPISODE OF RECURRENT MAJOR DEPRESSIVE DISORDER: ICD-10-CM

## 2024-04-11 RX ORDER — ESCITALOPRAM OXALATE 5 MG/1
5 TABLET ORAL DAILY
Qty: 30 TABLET | Refills: 0 | Status: SHIPPED | OUTPATIENT
Start: 2024-04-11 | End: 2024-04-12 | Stop reason: SDUPTHER

## 2024-04-12 ENCOUNTER — LAB VISIT (OUTPATIENT)
Dept: LAB | Facility: HOSPITAL | Age: 24
End: 2024-04-12
Attending: FAMILY MEDICINE
Payer: COMMERCIAL

## 2024-04-12 DIAGNOSIS — Z13.6 ENCOUNTER FOR LIPID SCREENING FOR CARDIOVASCULAR DISEASE: ICD-10-CM

## 2024-04-12 DIAGNOSIS — Z00.00 ENCOUNTER FOR WELLNESS EXAMINATION: ICD-10-CM

## 2024-04-12 DIAGNOSIS — E43 SEVERE PROTEIN-CALORIE MALNUTRITION: ICD-10-CM

## 2024-04-12 DIAGNOSIS — Z13.220 ENCOUNTER FOR LIPID SCREENING FOR CARDIOVASCULAR DISEASE: ICD-10-CM

## 2024-04-12 LAB
ALBUMIN SERPL-MCNC: 4.3 G/DL (ref 3.5–5)
ALBUMIN/GLOB SERPL: 1.9 RATIO (ref 1.1–2)
ALP SERPL-CCNC: 35 UNIT/L (ref 40–150)
ALT SERPL-CCNC: 14 UNIT/L (ref 0–55)
APPEARANCE UR: CLEAR
AST SERPL-CCNC: 32 UNIT/L (ref 5–34)
BACTERIA #/AREA URNS AUTO: NORMAL /HPF
BASOPHILS # BLD AUTO: 0.03 X10(3)/MCL
BASOPHILS NFR BLD AUTO: 0.5 %
BILIRUB SERPL-MCNC: 0.5 MG/DL
BILIRUB UR QL STRIP.AUTO: NEGATIVE
BUN SERPL-MCNC: 9.5 MG/DL (ref 8.9–20.6)
CALCIUM SERPL-MCNC: 9.3 MG/DL (ref 8.4–10.2)
CHLORIDE SERPL-SCNC: 105 MMOL/L (ref 98–107)
CHOLEST SERPL-MCNC: 119 MG/DL
CHOLEST/HDLC SERPL: 2 {RATIO} (ref 0–5)
CO2 SERPL-SCNC: 26 MMOL/L (ref 22–29)
COLOR UR AUTO: COLORLESS
CREAT SERPL-MCNC: 0.8 MG/DL (ref 0.73–1.18)
DEPRECATED CALCIDIOL+CALCIFEROL SERPL-MC: 12.4 NG/ML (ref 30–80)
EOSINOPHIL # BLD AUTO: 0.02 X10(3)/MCL (ref 0–0.9)
EOSINOPHIL NFR BLD AUTO: 0.3 %
ERYTHROCYTE [DISTWIDTH] IN BLOOD BY AUTOMATED COUNT: 11.9 % (ref 11.5–17)
FERRITIN SERPL-MCNC: 118.7 NG/ML (ref 21.81–274.66)
FOLATE SERPL-MCNC: 8.4 NG/ML (ref 7–31.4)
GFR SERPLBLD CREATININE-BSD FMLA CKD-EPI: >60 MLS/MIN/1.73/M2
GLOBULIN SER-MCNC: 2.3 GM/DL (ref 2.4–3.5)
GLUCOSE SERPL-MCNC: 90 MG/DL (ref 74–100)
GLUCOSE UR QL STRIP.AUTO: NORMAL
HCT VFR BLD AUTO: 39 % (ref 42–52)
HDLC SERPL-MCNC: 50 MG/DL (ref 35–60)
HGB BLD-MCNC: 13.9 G/DL (ref 14–18)
IMM GRANULOCYTES # BLD AUTO: 0.01 X10(3)/MCL (ref 0–0.04)
IMM GRANULOCYTES NFR BLD AUTO: 0.2 %
IRON SATN MFR SERPL: 34 % (ref 20–50)
IRON SERPL-MCNC: 95 UG/DL (ref 65–175)
KETONES UR QL STRIP.AUTO: NEGATIVE
LDLC SERPL CALC-MCNC: 59 MG/DL (ref 50–140)
LEUKOCYTE ESTERASE UR QL STRIP.AUTO: NEGATIVE
LYMPHOCYTES # BLD AUTO: 1.34 X10(3)/MCL (ref 0.6–4.6)
LYMPHOCYTES NFR BLD AUTO: 22.7 %
MCH RBC QN AUTO: 32.5 PG (ref 27–31)
MCHC RBC AUTO-ENTMCNC: 35.6 G/DL (ref 33–36)
MCV RBC AUTO: 91.1 FL (ref 80–94)
MONOCYTES # BLD AUTO: 0.36 X10(3)/MCL (ref 0.1–1.3)
MONOCYTES NFR BLD AUTO: 6.1 %
NEUTROPHILS # BLD AUTO: 4.15 X10(3)/MCL (ref 2.1–9.2)
NEUTROPHILS NFR BLD AUTO: 70.2 %
NITRITE UR QL STRIP.AUTO: NEGATIVE
NRBC BLD AUTO-RTO: 0 %
PH UR STRIP.AUTO: 7 [PH]
PLATELET # BLD AUTO: 164 X10(3)/MCL (ref 130–400)
PLATELETS.RETICULATED NFR BLD AUTO: 14.9 % (ref 0.9–11.2)
PMV BLD AUTO: 12.8 FL (ref 7.4–10.4)
POTASSIUM SERPL-SCNC: 3.9 MMOL/L (ref 3.5–5.1)
PROT SERPL-MCNC: 6.6 GM/DL (ref 6.4–8.3)
PROT UR QL STRIP.AUTO: NEGATIVE
RBC # BLD AUTO: 4.28 X10(6)/MCL (ref 4.7–6.1)
RBC #/AREA URNS AUTO: NORMAL /HPF
RBC UR QL AUTO: NEGATIVE
SODIUM SERPL-SCNC: 139 MMOL/L (ref 136–145)
SP GR UR STRIP.AUTO: 1.01 (ref 1–1.03)
SQUAMOUS #/AREA URNS LPF: NORMAL /HPF
TIBC SERPL-MCNC: 182 UG/DL (ref 69–240)
TIBC SERPL-MCNC: 277 UG/DL (ref 250–450)
TRANSFERRIN SERPL-MCNC: 240 MG/DL (ref 174–364)
TRIGL SERPL-MCNC: 50 MG/DL (ref 34–140)
TSH SERPL-ACNC: 0.38 UIU/ML (ref 0.35–4.94)
UROBILINOGEN UR STRIP-ACNC: NORMAL
VIT B12 SERPL-MCNC: 251 PG/ML (ref 213–816)
VLDLC SERPL CALC-MCNC: 10 MG/DL
WBC # SPEC AUTO: 5.91 X10(3)/MCL (ref 4.5–11.5)
WBC #/AREA URNS AUTO: NORMAL /HPF

## 2024-04-12 PROCEDURE — 82746 ASSAY OF FOLIC ACID SERUM: CPT

## 2024-04-12 PROCEDURE — 81001 URINALYSIS AUTO W/SCOPE: CPT

## 2024-04-12 PROCEDURE — 84443 ASSAY THYROID STIM HORMONE: CPT

## 2024-04-12 PROCEDURE — 82306 VITAMIN D 25 HYDROXY: CPT

## 2024-04-12 PROCEDURE — 82607 VITAMIN B-12: CPT

## 2024-04-12 PROCEDURE — 36415 COLL VENOUS BLD VENIPUNCTURE: CPT

## 2024-04-12 PROCEDURE — 80061 LIPID PANEL: CPT

## 2024-04-12 PROCEDURE — 82728 ASSAY OF FERRITIN: CPT

## 2024-04-12 PROCEDURE — 85025 COMPLETE CBC W/AUTO DIFF WBC: CPT

## 2024-04-12 PROCEDURE — 83540 ASSAY OF IRON: CPT

## 2024-04-12 PROCEDURE — 80053 COMPREHEN METABOLIC PANEL: CPT

## 2024-04-12 RX ORDER — ESCITALOPRAM OXALATE 5 MG/1
5 TABLET ORAL DAILY
Qty: 30 TABLET | Refills: 0 | Status: SHIPPED | OUTPATIENT
Start: 2024-04-12 | End: 2024-04-15

## 2024-04-12 NOTE — TELEPHONE ENCOUNTER
Pt notified of the recommendations. Verbal understanding. He is planning to keep his upcoming appt and have his labs done prior to the appt

## 2024-04-15 ENCOUNTER — OFFICE VISIT (OUTPATIENT)
Dept: FAMILY MEDICINE | Facility: CLINIC | Age: 24
End: 2024-04-15
Payer: COMMERCIAL

## 2024-04-15 VITALS
SYSTOLIC BLOOD PRESSURE: 109 MMHG | WEIGHT: 106.13 LBS | BODY MASS INDEX: 15.72 KG/M2 | DIASTOLIC BLOOD PRESSURE: 73 MMHG | HEIGHT: 69 IN | RESPIRATION RATE: 18 BRPM | TEMPERATURE: 98 F | HEART RATE: 61 BPM | OXYGEN SATURATION: 97 %

## 2024-04-15 DIAGNOSIS — E43 SEVERE PROTEIN-CALORIE MALNUTRITION: Primary | ICD-10-CM

## 2024-04-15 DIAGNOSIS — F33.1 MODERATE EPISODE OF RECURRENT MAJOR DEPRESSIVE DISORDER: ICD-10-CM

## 2024-04-15 DIAGNOSIS — R79.89 LOW TSH LEVEL: ICD-10-CM

## 2024-04-15 DIAGNOSIS — E53.8 VITAMIN B12 DEFICIENCY: ICD-10-CM

## 2024-04-15 DIAGNOSIS — E55.9 VITAMIN D DEFICIENCY: ICD-10-CM

## 2024-04-15 DIAGNOSIS — R68.81 EARLY SATIETY: ICD-10-CM

## 2024-04-15 DIAGNOSIS — Z11.3 ROUTINE SCREENING FOR STI (SEXUALLY TRANSMITTED INFECTION): ICD-10-CM

## 2024-04-15 DIAGNOSIS — R79.89 ABNORMAL CBC: ICD-10-CM

## 2024-04-15 PROCEDURE — 3008F BODY MASS INDEX DOCD: CPT | Mod: CPTII,,, | Performed by: FAMILY MEDICINE

## 2024-04-15 PROCEDURE — 99214 OFFICE O/P EST MOD 30 MIN: CPT | Mod: ,,, | Performed by: FAMILY MEDICINE

## 2024-04-15 PROCEDURE — 3074F SYST BP LT 130 MM HG: CPT | Mod: CPTII,,, | Performed by: FAMILY MEDICINE

## 2024-04-15 PROCEDURE — 1160F RVW MEDS BY RX/DR IN RCRD: CPT | Mod: CPTII,,, | Performed by: FAMILY MEDICINE

## 2024-04-15 PROCEDURE — 1159F MED LIST DOCD IN RCRD: CPT | Mod: CPTII,,, | Performed by: FAMILY MEDICINE

## 2024-04-15 PROCEDURE — 3078F DIAST BP <80 MM HG: CPT | Mod: CPTII,,, | Performed by: FAMILY MEDICINE

## 2024-04-15 RX ORDER — ESCITALOPRAM OXALATE 10 MG/1
10 TABLET ORAL DAILY
Qty: 90 TABLET | Refills: 3 | Status: SHIPPED | OUTPATIENT
Start: 2024-04-15 | End: 2025-04-15

## 2024-04-15 NOTE — PROGRESS NOTES
Xu Delarosa  04/15/2024  [id number]    Gilma Driscoll MD  Patient Care Team:  Gilma Driscoll MD as PCP - General      Chief Complaint:  Chief Complaint   Patient presents with    Follow-up     2 week follow up for depression with lab results       History of Present Illness:    24 y.o. male who presents today for depression f/u with labs. Labs reviewed by me and were discussed with patient. All questions regarding lab results were answered.     Labs reveal low vit d, vit b12, high immature platelet fracture and borderline low tsh. He saw a nutritionist and is on a high protein, high fat diet and eating more calories but still is not gaining weight. He c/o early satiety and significantly decreased appetite despite forcing himself to eat 3 meals per day and drink protein shakes. Has  a normal BM every 3 days. Denies abd pain, n/v/d, hematochezia, night sweats.     Review of Systems  General: denies f/c, weight loss, night sweats, decreased appetite  Eye: denies blurred vision, changes in vision  Respiratory: denies sob, wheezing, cough  Cardiovascular: denies chest pain, palpitations, edema  Gastrointestinal: denies abdominal pain, n/v, constipation, diarrhea  Integumentary: denies rashes, pruritis    Past Medical History  Past Medical History:   Diagnosis Date    Depression        Medications  Medication List with Changes/Refills   New Medications    ESCITALOPRAM OXALATE (LEXAPRO) 10 MG TABLET    Take 1 tablet (10 mg total) by mouth once daily.   Discontinued Medications    ESCITALOPRAM OXALATE (LEXAPRO) 5 MG TAB    Take 1 tablet (5 mg total) by mouth once daily.       No past surgical history on file.    SUBJECTIVE:  Health Maintenance  The patient has no Health Maintenance topics of status Not Due  Health Maintenance Due   Topic Date Due    Hepatitis C Screening  Never done    Pneumococcal Vaccines (Age 0-64) (1 of 2 - PCV) Never done    HIV Screening  Never done    HPV Vaccines (1 - Male 3-dose  "series) Never done    TETANUS VACCINE  04/20/2022    Influenza Vaccine (1) 09/01/2023    COVID-19 Vaccine (3 - 2023-24 season) 09/01/2023       Exam:  Vitals:    04/15/24 1034   BP: 109/73   BP Location: Left arm   Patient Position: Sitting   BP Method: Small (Automatic)   Pulse: 61   Resp: 18   Temp: 98.2 °F (36.8 °C)   TempSrc: Oral   SpO2: 97%   Weight: 48.1 kg (106 lb 1.6 oz)   Height: 5' 9" (1.753 m)     Weight: 48.1 kg (106 lb 1.6 oz)   Body mass index is 15.67 kg/m².      Physical Exam  Constitutional: NAD, alert, pleasant, underweight  Respiratory: No accessory muscle use  Eyes: EOMI  Cardiovascular:No LE edema  Integumentary: warm, dry, intact  Psych: AA&Ox3      ICD-10-CM ICD-9-CM   1. Severe protein-calorie malnutrition  E43 262   2. Moderate episode of recurrent major depressive disorder  F33.1 296.32   3. Vitamin D deficiency  E55.9 268.9   4. Vitamin B12 deficiency  E53.8 266.2   5. Early satiety  R68.81 780.94   6. Abnormal CBC  R79.89 790.6   7. Routine screening for STI (sexually transmitted infection)  Z11.3 V74.5   8. Low TSH level  R79.89 794.5       1. Severe protein-calorie malnutrition  Overview:  Saw a dietitian. Eating more protein, calroies and fats. Weight is stable  Recommend high fat high protein diet          2. Moderate episode of recurrent major depressive disorder  Overview:  Was doing well on lexapro 5 mg daily but states it is no longer working well    Increase lexapro to 10 mg and rtc 3 mts or sooner if needed      Orders:  -     EScitalopram oxalate (LEXAPRO) 10 MG tablet; Take 1 tablet (10 mg total) by mouth once daily.  Dispense: 90 tablet; Refill: 3    3. Vitamin D deficiency  Overview:  Vit d level is 12    Start otc vit d3 2000 IU daily      4. Vitamin B12 deficiency  Overview:  Start otc vit b12 sublingual 1000 mcg daily  Recheck vit b12 in 3 mts      5. Early satiety  Comments:  check GES  check hiv, hep c and rpr  Orders:  -     NM Gastric Emptying; Future; Expected date: " 04/15/2024    6. Abnormal CBC  Comments:  e-cpnsult to heme/onc for high IPF  Orders:  -     E-Consult to Hemonc    7. Routine screening for STI (sexually transmitted infection)  -     HIV 1/2 Ag/Ab (4th Gen); Future; Expected date: 07/15/2024  -     Hepatitis C Antibody; Future; Expected date: 07/15/2024  -     SYPHILIS ANTIBODY (WITH REFLEX RPR); Future; Expected date: 07/15/2024    8. Low TSH level  Comments:  repeat tsh with free t4 in 3 mts  Orders:  -     T4, Free; Future; Expected date: 07/15/2024  -     TSH; Future; Expected date: 07/15/2024         Follow up: No follow-ups on file.      Care Plan/Goals: Reviewed   Goals    None

## 2024-04-17 ENCOUNTER — E-CONSULT (OUTPATIENT)
Dept: HEMATOLOGY/ONCOLOGY | Facility: CLINIC | Age: 24
End: 2024-04-17
Payer: COMMERCIAL

## 2024-04-17 DIAGNOSIS — D69.1 ABNORMAL PLATELETS: Primary | ICD-10-CM

## 2024-04-17 PROCEDURE — 99451 NTRPROF PH1/NTRNET/EHR 5/>: CPT | Mod: S$GLB,,, | Performed by: INTERNAL MEDICINE

## 2024-04-17 NOTE — CONSULTS
[facility] - Hematology [facility]  Response for E-Consult     Patient Name: Xu Delarosa  MRN: [id number]  Primary Care Provider: Gilma Driscoll MD   Requesting Provider: Gilma Driscoll,*  E-Consult to Hemonc  Consult performed by: Cora Ferrara MD  Consult ordered by: Gilma Driscoll MD  Reason for consult: increase in inmature platelets          Recommendation: With a normal platelet number and no history of platelet dysfunction I would not do a larger workup.     Contingency: Please refer to heme if platelet count drops below 100k or should a bleeding disorder evolve.    Total time of Consultation: 10 minute    I did not speak to the requesting provider verbally about this.     *This eConsult is based on the clinical data available to me and is furnished without benefit of a physical examination. The eConsult will need to be interpreted in light of any clinical issues or changes in patient status not available to me at the time of filing this eConsults. Significant changes in patient condition or level of acuity should result in immediate formal consultation and reevaluation. Please alert me if you have further questions.    Thank you for this eConsult referral.     Cora Ferrara MD  [facility] - Hematology [facility]